# Patient Record
Sex: FEMALE | URBAN - NONMETROPOLITAN AREA
[De-identification: names, ages, dates, MRNs, and addresses within clinical notes are randomized per-mention and may not be internally consistent; named-entity substitution may affect disease eponyms.]

---

## 2021-04-02 ENCOUNTER — OFFICE VISIT (OUTPATIENT)
Dept: FAMILY MEDICINE CLINIC | Age: 71
End: 2021-04-02
Payer: MEDICARE

## 2021-04-02 VITALS
DIASTOLIC BLOOD PRESSURE: 76 MMHG | HEART RATE: 84 BPM | WEIGHT: 179.8 LBS | HEIGHT: 66 IN | TEMPERATURE: 97.9 F | SYSTOLIC BLOOD PRESSURE: 138 MMHG | OXYGEN SATURATION: 98 % | BODY MASS INDEX: 28.9 KG/M2 | RESPIRATION RATE: 20 BRPM

## 2021-04-02 DIAGNOSIS — M62.838 MUSCLE SPASM: Primary | ICD-10-CM

## 2021-04-02 DIAGNOSIS — I10 HYPERTENSION, ESSENTIAL, BENIGN: ICD-10-CM

## 2021-04-02 DIAGNOSIS — Z87.19 HISTORY OF IBS: ICD-10-CM

## 2021-04-02 DIAGNOSIS — J30.2 SEASONAL ALLERGIC RHINITIS, UNSPECIFIED TRIGGER: ICD-10-CM

## 2021-04-02 DIAGNOSIS — Z90.49 HISTORY OF CHOLECYSTECTOMY: ICD-10-CM

## 2021-04-02 PROCEDURE — 1090F PRES/ABSN URINE INCON ASSESS: CPT | Performed by: NURSE PRACTITIONER

## 2021-04-02 PROCEDURE — 3017F COLORECTAL CA SCREEN DOC REV: CPT | Performed by: NURSE PRACTITIONER

## 2021-04-02 PROCEDURE — 1036F TOBACCO NON-USER: CPT | Performed by: NURSE PRACTITIONER

## 2021-04-02 PROCEDURE — 99212 OFFICE O/P EST SF 10 MIN: CPT | Performed by: NURSE PRACTITIONER

## 2021-04-02 PROCEDURE — G8417 CALC BMI ABV UP PARAM F/U: HCPCS | Performed by: NURSE PRACTITIONER

## 2021-04-02 PROCEDURE — 99203 OFFICE O/P NEW LOW 30 MIN: CPT | Performed by: NURSE PRACTITIONER

## 2021-04-02 PROCEDURE — G8400 PT W/DXA NO RESULTS DOC: HCPCS | Performed by: NURSE PRACTITIONER

## 2021-04-02 PROCEDURE — 1123F ACP DISCUSS/DSCN MKR DOCD: CPT | Performed by: NURSE PRACTITIONER

## 2021-04-02 PROCEDURE — 4040F PNEUMOC VAC/ADMIN/RCVD: CPT | Performed by: NURSE PRACTITIONER

## 2021-04-02 PROCEDURE — G8427 DOCREV CUR MEDS BY ELIG CLIN: HCPCS | Performed by: NURSE PRACTITIONER

## 2021-04-02 RX ORDER — ALBUTEROL SULFATE 90 UG/1
2 AEROSOL, METERED RESPIRATORY (INHALATION) EVERY 6 HOURS PRN
COMMUNITY

## 2021-04-02 RX ORDER — CETIRIZINE HYDROCHLORIDE 10 MG/1
10 TABLET ORAL DAILY
COMMUNITY

## 2021-04-02 RX ORDER — FLUTICASONE PROPIONATE 50 MCG
1 SPRAY, SUSPENSION (ML) NASAL DAILY
COMMUNITY

## 2021-04-02 SDOH — ECONOMIC STABILITY: TRANSPORTATION INSECURITY
IN THE PAST 12 MONTHS, HAS THE LACK OF TRANSPORTATION KEPT YOU FROM MEDICAL APPOINTMENTS OR FROM GETTING MEDICATIONS?: NOT ASKED

## 2021-04-02 SDOH — ECONOMIC STABILITY: INCOME INSECURITY: HOW HARD IS IT FOR YOU TO PAY FOR THE VERY BASICS LIKE FOOD, HOUSING, MEDICAL CARE, AND HEATING?: NOT HARD AT ALL

## 2021-04-02 SDOH — ECONOMIC STABILITY: FOOD INSECURITY: WITHIN THE PAST 12 MONTHS, THE FOOD YOU BOUGHT JUST DIDN'T LAST AND YOU DIDN'T HAVE MONEY TO GET MORE.: NEVER TRUE

## 2021-04-02 ASSESSMENT — PATIENT HEALTH QUESTIONNAIRE - PHQ9
2. FEELING DOWN, DEPRESSED OR HOPELESS: 0
SUM OF ALL RESPONSES TO PHQ QUESTIONS 1-9: 0
SUM OF ALL RESPONSES TO PHQ9 QUESTIONS 1 & 2: 0

## 2021-04-02 NOTE — PROGRESS NOTES
[address] [place] [id number]  Dept: [phone]  Dept Fax: [phone]  Loc: [phone]     Visit Date:  4/2/2021    Patient:  Primus Zachary  YOB: 1950    HPI:     Chief Complaint   Patient presents with    Other     get established. She had cholycystectomy in 2019. Sh has had issues every since she had procedure. She reports that she has been having twitching inside that has gotten worse last 2 months. She gets SOB at times. New to our practice, previously known from [facility], many years back,    HPI  IBS, intolerance to certain food, anxious over sensitive bowels and has tried OTC digestive enzymes, changes since GB removal surgery     Hypertension, benign essential, blood pressure today is 138/76, patient states it is more controlled outside of the office, she is nervous today about new office in setting, finds herself more uptight over the last few years, was hoping to move to [facility] and is unable to afford it and must stay in [place]  Medications  Prior to Visit Medications    Medication Sig Taking? Authorizing Provider   fluticasone (FLONASE) 50 MCG/ACT nasal spray 1 spray by Each Nostril route daily Yes Historical Provider, MD   albuterol sulfate  (90 Base) MCG/ACT inhaler Inhale 2 puffs into the lungs every 6 hours as needed for Wheezing Yes Historical Provider, MD   cetirizine (ZYRTEC) 10 MG tablet Take 10 mg by mouth daily Yes Historical Provider, MD   Magnesium-Potassium (MAGNESIUM FIZZ-PLUS PO) Take by mouth Yes Historical Provider, MD        The patient is allergic to codeine; doxycycline; lidocaine; morphine; omeprazole; and penicillins. Past Medical History  Beverly Ramírez  has no past medical history on file. Past Surgical History  The patient  has a past surgical history that includes Cholecystectomy; Tubal ligation; and skin biopsy. sounds: S1 normal and S2 normal. No murmur. Pulmonary:      Effort: Pulmonary effort is normal.      Breath sounds: Normal breath sounds. Abdominal:      General: Bowel sounds are normal.      Palpations: Abdomen is soft. Musculoskeletal:      Right lower leg: No edema. Left lower leg: No edema. Skin:     General: Skin is warm. Neurological:      Mental Status: She is alert. Psychiatric:         Attention and Perception: Attention normal.         Behavior: Behavior is cooperative. Judgment: Judgment normal.         Labs Reviewed 4/2/2021:    Lab Results   Component Value Date    WBC 5.7 04/05/2021    HGB 15.1 04/05/2021    HCT 47.5 (H) 04/05/2021    .2 04/05/2021    CHOL 224 (H) 04/05/2021    TRIG 140 04/05/2021    HDL 62 04/05/2021    ALT 19 04/05/2021    AST 26 04/05/2021     04/05/2021    K 4.0 04/05/2021     04/05/2021    CREATININE 0.8 04/05/2021    BUN 20 (H) 04/05/2021    CO2 28 04/05/2021    TSH 2.02 04/05/2021       Assessment/Plan      1. Muscle spasm    - Magnesium; Future    2. Hypertension, essential, benign  I have reviewed historical vital signs, lab work, and most recent patient encounter. Discussion of healthful lifestyle, low-sodium diet, cardiovascular activity on a routine basis emphasized today, time allotted for questions and answer. Back in 3 months with an office visit and labs as appropriate  - Lipid Panel; Future  - Comprehensive Metabolic Panel; Future  - CBC Auto Differential; Future  - TSH without Reflex; Future    3. History of IBS  Consider prebiotic and probiotics regimens, consider ssri like zoloft   Brief discussion and refuses zoloft today    3 months we will recheck    4. History of cholecystectomy      5. Seasonal allergic rhinitis, unspecified trigger    Avoid triggers and consider flonase and zyrtec as needed    Return in about 3 months (around 7/2/2021). Patient given educational materials - see patient instructions.   Discussed

## 2021-04-05 PROBLEM — Z90.49 HISTORY OF CHOLECYSTECTOMY: Status: ACTIVE | Noted: 2021-04-05

## 2021-04-05 PROBLEM — I10 HYPERTENSION, ESSENTIAL, BENIGN: Status: ACTIVE | Noted: 2021-04-05

## 2021-04-05 PROBLEM — J30.2 SEASONAL ALLERGIC RHINITIS: Status: ACTIVE | Noted: 2021-04-05

## 2021-04-05 PROBLEM — Z87.19 HISTORY OF IBS: Status: ACTIVE | Noted: 2021-04-05

## 2021-04-05 PROBLEM — M62.838 MUSCLE SPASM: Status: ACTIVE | Noted: 2021-04-05

## 2021-04-05 LAB
ALBUMIN/GLOBULIN RATIO: 1.52 G/DL
ALBUMIN: 4.4 G/DL (ref 3.5–5)
ALP BLD-CCNC: 101 UNITS/L (ref 38–126)
ALT SERPL-CCNC: 19 UNITS/L (ref 4–35)
ANION GAP SERPL CALCULATED.3IONS-SCNC: 13 MMOL/L
AST SERPL-CCNC: 26 UNITS/L (ref 14–36)
BASOPHILS %: 0.85 (ref 0–3)
BASOPHILS ABSOLUTE: 0.05 (ref 0–0.3)
BILIRUB SERPL-MCNC: 0.7 MG/DL (ref 0.2–1.3)
BUN BLDV-MCNC: 20 MG/DL (ref 7–17)
CALCIUM SERPL-MCNC: 9.7 MG/DL (ref 8.4–10.2)
CHLORIDE BLD-SCNC: 103 MMOL/L (ref 98–120)
CHOLESTEROL/HDL RATIO: 3.61 RATIO (ref 0–4.5)
CHOLESTEROL: 224 MG/DL (ref 50–200)
CO2: 28 MMOL/L (ref 22–31)
CREAT SERPL-MCNC: 0.8 MG/DL (ref 0.5–1)
EOSINOPHILS %: 3.61 (ref 0–10)
EOSINOPHILS ABSOLUTE: 0.21 (ref 0–1.1)
GFR CALCULATED: > 60
GLOBULIN: 2.9 G/DL
GLUCOSE: 104 MG/DL (ref 65–105)
HCT VFR BLD CALC: 47.5 % (ref 37–47)
HDLC SERPL-MCNC: 62 MG/DL (ref 36–68)
HEMOGLOBIN: 15.1 (ref 12–16)
LDL CHOLESTEROL CALCULATED: 134 MG/DL (ref 0–160)
LYMPHOCYTE %: 26.97 (ref 20–51.1)
LYMPHOCYTES ABSOLUTE: 1.53 (ref 1–5.5)
MAGNESIUM: 2.1 MG/DL (ref 1.6–2.3)
MCH RBC QN AUTO: 30.2 PG (ref 28.5–32.5)
MCHC RBC AUTO-ENTMCNC: 31.9 G/DL (ref 32–37)
MCV RBC AUTO: 94.6 FL (ref 80–94)
MONOCYTES %: 7.88 (ref 1.7–9.3)
MONOCYTES ABSOLUTE: 0.45 (ref 0.1–1)
NEUTROPHILS %: 60.69 (ref 42.2–75.2)
NEUTROPHILS ABSOLUTE: 3.44 (ref 2–8.1)
PDW BLD-RTO: 11.3 % (ref 8.5–15.5)
PLATELET # BLD: 221.2 THOU/MM3 (ref 130–400)
POTASSIUM SERPL-SCNC: 4 MMOL/L (ref 3.6–5)
RBC: 5.02 M/UL (ref 4.2–5.4)
SODIUM BLD-SCNC: 140 MMOL/L (ref 135–145)
TOTAL PROTEIN, SERUM: 7.4 G/DL (ref 6.3–8.2)
TRIGL SERPL-MCNC: 140 MG/DL (ref 10–250)
TSH SERPL DL<=0.05 MIU/L-ACNC: 2.02 MIU/ML (ref 0.49–4.67)
VLDLC SERPL CALC-MCNC: 28 MG/DL (ref 0–50)
WBC: 5.7 THOU/ML3 (ref 4.8–10.8)

## 2021-04-05 ASSESSMENT — ENCOUNTER SYMPTOMS
COUGH: 0
SHORTNESS OF BREATH: 0
WHEEZING: 0
ABDOMINAL PAIN: 0

## 2021-04-12 DIAGNOSIS — Z87.19 HISTORY OF IBS: ICD-10-CM

## 2021-04-12 DIAGNOSIS — M62.838 MUSCLE SPASM: ICD-10-CM

## 2021-04-12 DIAGNOSIS — Z90.49 HISTORY OF CHOLECYSTECTOMY: Primary | ICD-10-CM

## 2021-06-14 DIAGNOSIS — Z12.31 BREAST CANCER SCREENING BY MAMMOGRAM: Primary | ICD-10-CM
